# Patient Record
Sex: FEMALE | Race: WHITE
[De-identification: names, ages, dates, MRNs, and addresses within clinical notes are randomized per-mention and may not be internally consistent; named-entity substitution may affect disease eponyms.]

---

## 2017-01-27 ENCOUNTER — HOSPITAL ENCOUNTER (EMERGENCY)
Dept: HOSPITAL 25 - UCCORT | Age: 32
Discharge: HOME | End: 2017-01-27
Payer: SELF-PAY

## 2017-01-27 VITALS — SYSTOLIC BLOOD PRESSURE: 114 MMHG | DIASTOLIC BLOOD PRESSURE: 61 MMHG

## 2017-01-27 DIAGNOSIS — F17.210: ICD-10-CM

## 2017-01-27 DIAGNOSIS — B35.6: Primary | ICD-10-CM

## 2017-01-27 PROCEDURE — G0463 HOSPITAL OUTPT CLINIC VISIT: HCPCS

## 2017-01-27 PROCEDURE — 99212 OFFICE O/P EST SF 10 MIN: CPT

## 2017-01-27 NOTE — UC
Skin Complaint HPI





- HPI Summary


HPI Summary: 





pt reports sudden onset of pruritic circular dry skin mild erythematous area in 

left groin. P treprots that she has had exposure to ring worm.  





- History of Current Complaint


Time Seen by Provider: 01/27/17 19:52


Stated Complaint: RASH


Hx Obtained From: Patient


Hx Last Menstrual Period: 1/3/17


Pregnant?: No


Onset/Duration: Sudden Onset, Lasting Hours


Skin Exposure Onset/Duration: Hours Ago


Timing: Constant


Onset Severity: Mild


Current Severity: Mild


Location: Discrete - left groin


Character: Pruritus, Redness


Aggravating: Touch


Alleviating: Unknown


Associated Signs & Symptoms: Positive: Rash





- Allergy/Home Medications


Allergies/Adverse Reactions: 


 Allergies











Allergy/AdvReac Type Severity Reaction Status Date / Time


 


No Known Allergies Allergy   Verified 01/27/17 19:55














Review of Systems


Constitutional: Negative


Skin: Rash


Eyes: Negative


ENT: Negative


Respiratory: Negative


Cardiovascular: Negative


Gastrointestinal: Negative


Genitourinary: Negative


Motor: Negative


Neurovascular: Negative


Musculoskeletal: Negative


Neurological: Negative


Psychological: Negative


All Other Systems Reviewed And Are Negative: Yes





PMH/Surg Hx/FS Hx/Imm Hx


Previously Healthy: Yes





- Surgical History


Surgical History: Yes


Surgery Procedure, Year, and Place: fx left ankle/pin in it as a child





- Family History


Known Family History: Positive: Other - positive FM for Tinea





- Social History


Lives: With Family


Alcohol Use: None


Substance Use Type: None


Smoking Status (MU): Light Every Day Tobacco Smoker


Type: Cigarettes


Amount Used/How Often: 5 cigs daily


Household Exposure Type: Cigarettes





- Immunization History


Most Recent Influenza Vaccination: none





Physical Exam


Triage Information Reviewed: Yes


Appearance: Well-Appearing


Vital Signs: 


 Initial Vital Signs











Temp  98.6 F   01/27/17 19:51


 


Pulse  79   01/27/17 19:51


 


Resp  15   01/27/17 19:51


 


BP  114/61   01/27/17 19:51


 


Pulse Ox  100   01/27/17 19:51











Vital Signs Reviewed: Yes


Eye Exam: Normal


Dental Exam: Other


Dental: Positive: Gross Decay/Caries @, Other: - gingivitis


Respiratory Exam: Other


Respiratory: Positive: No respiratory distress


Musculoskeletal Exam: Normal


Neurological Exam: Normal


Psychological Exam: Normal


Skin Exam: Other - quarter size circular area left groin ,with dry flaky skin 

mild swelling





Course/Dx





- Differential Diagnoses - Skin Complaint


Differential Diagnoses: Tinea, Other - Syphilis chancre





- Diagnoses


Provider Diagnoses: tinea cruris





Discharge





- Discharge Plan


Condition: Stable


Disposition: HOME


Prescriptions: 


Terbinafine HCl (Topical) [Lamisil At] 1 % EX Q8HR #1 tube


Patient Education Materials:  Skin Yeast Infection (ED)


Referrals: 


Freya Feliciano MD [Primary Care Provider] -

## 2020-03-28 ENCOUNTER — HOSPITAL ENCOUNTER (EMERGENCY)
Dept: HOSPITAL 25 - UCCORT | Age: 35
Discharge: HOME | End: 2020-03-28
Payer: COMMERCIAL

## 2020-03-28 VITALS — SYSTOLIC BLOOD PRESSURE: 118 MMHG | DIASTOLIC BLOOD PRESSURE: 79 MMHG

## 2020-03-28 DIAGNOSIS — K04.7: Primary | ICD-10-CM

## 2020-03-28 DIAGNOSIS — K02.9: ICD-10-CM

## 2020-03-28 DIAGNOSIS — F17.210: ICD-10-CM

## 2020-03-28 PROCEDURE — G0463 HOSPITAL OUTPT CLINIC VISIT: HCPCS

## 2020-03-28 PROCEDURE — 99212 OFFICE O/P EST SF 10 MIN: CPT

## 2020-03-28 NOTE — XMS REPORT
Summary of Care

 Created on:2020



Patient:Mary Desir

Sex:Female

:1985

External Reference #:[id number]





Demographics







 Address  [address]



   [place] [address]

 

 Home Phone  [phone]

 

 Preferred Language  English

 

 Marital Status  Not  or 

 

 Mu-ism Affiliation  Unknown

 

 Race  White

 

 Ethnic Group  Not  or 









Author







 Organization  [facility]

 

 Address  [address] [place] [address]









Support







 Name  Relationship  Address  Phone

 

 Flora Vivar  Mother  Unavailable  [phone]









Care Team Providers







 Name  Role  Phone

 

 Byron Freitas LUANA  Primary Care Provider  [phone]









Reason for Visit







 Reason  Comments

 

 Donor Evaluation  







Encounter Details







 Date  Type  Department  Care Team  Description

 

 2020  Office Visit  [facility]  Bre Israel,  Kidney donor (
Primary



     [facility])



     [facility]



  [address]



  



     [address] [facility]



  [address] [place] [address] [address]



  [place]  [address]



  



     [id number] [phone]  



       (Fax)  







Allergies

No Known Allergiesdocumented as of this encounter (statuses as of 2020)



Medications







 Medication  Sig  Dispensed  Refills  Start Date  End Date  Status

 

 sertraline (ZOLOFT)  Take 50 mg    0  03/15/2018    Active



 50 MG tablet  by mouth          



   daily          

 

 famotidine (PEPCID)  Take 20 mg    0  2018    Active



 20 MG tablet  by mouth          



   daily          

 

 acetaminophen  Take 1,000    0      Active



 (TYLENOL) 500 MG  mg by mouth          



 tablet  every 6          



   (six) hours          



   as needed          



   for Pain          

 

 ibuprofen  Take 600 mg    0  2018  Discontinued



 (ADVIL,MOTRIN) 600 MG  by mouth          



 tablet  every 6          



   (six) hours          



   as needed          

 

 ondansetron  Take 1  12 tablet  0  2018  Discontinued



 (ZOFRAN-ODT) 4 MG  tablet by          



 disintegrating tablet  mouth every          



   8 (eight)          



   hours as          



   needed  for          



   Nausea          



documented as of this encounter (statuses as of 2020)



Active Problems

No known active problemsdocumented as of this encounter (statuses as of 2020)



Social History







 Tobacco Use  Types  Packs/Day  Years Used  Date

 

 Never Smoker        









 Alcohol Use  Drinks/Week  oz/Week  Comments

 

 No      









 Sex Assigned at Birth  Date Recorded

 

 Not on file  









 Job Start Date  Occupation  Industry

 

 Not on file  Not on file  Not on file









 Travel History  Travel Start  Travel End









 No recent travel history available.



documented as of this encounter



Last Filed Vital Signs







 Vital Sign  Reading  Time Taken  Comments

 

 Blood Pressure  119/73  2020 10:52 AM EST  

 

 Pulse  71  2020 10:52 AM EST  

 

 Temperature  36.4 

  2020 10:52 AM EST  



   C (97.5 

    



   F)    

 

 Respiratory Rate  -  -  

 

 Oxygen Saturation  99%  2020 10:52 AM EST  

 

 Inhaled Oxygen Concentration  -  -  

 

 Weight  75.8 kg (167 lb)  2020 10:52 AM EST  

 

 Height  176 cm (5' 9.29")  2020 10:52 AM EST  

 

 Body Mass Index  24.45  2020 10:52 AM EST  



documented in this encounter



Progress Notes

Zachary Ramirez - 2020 11:00 AM ESTFormatting of this note might be 
different from the original.

Renal Transplant Donor History and Physical



Chief Complaint: Mary Desir is presenting for evaluation for Donor 
Nephrectomy.



HISTORY OF PRESENT ILLNESS :



Mary Desir is a 34 y.o. female with no significant PMH who presents for 
evaluation regarding kidney donation to her friend, Raymundo Truong, an ESRD 
patient with this clinic. She states that she met Raymundo ~1 year ago through a 
friend and wants to be see if she would be a match. She has not told Raymundo that 
she is going through this workup yet.



She has no chronic medical conditions and reports no recent illnesses or 
hospitalizations. Her PSH is significant for a laprascopic cholecystectomy in 
2018 that did not require a hospitalization. She denied any anesthesia-related 
issues following the surgery. She had an ankle pin placed at age 7. She has 
three children (13, 6, and 2) all born vaginally with no post-partum 
complications.



She is not currently taking any medications, including birth control, and she 
states she is not taking daily multivitamins. She reports no known allergies.



Her family history was significant for high cholesterol in her maternal 
grandfather. Her mother has no significant medical history, and she does not 
know anything about her father's medical history.



Of note in her social history is a 2 pack-year smoking history. She states that 
she has attempted toquit in the past, and states that she could quit prior to 
the surgery. She denies alcohol use, IV drug use, and use of prescription drugs 
not prescribed to her. She denies any recent travel.



Of note in her ROS was occasional diarrhea following meals since her 
cholecystectomy. The rest of the ROS was as documented below.



REVIEW OF SYSTEMS:

Constitutional: Negative.  Negative for fever, chills, diaphoresis, activity 
change, appetite change, fatigue and unexpected weight change.

HENT: Negative for facial swelling, neck pain and neck stiffness.

Eyes: Negative for discharge, itching and visual disturbance.

Respiratory: Negative for cough, choking, chest tightness, shortness of breath 
and wheezing.

Cardiovascular: Negative for chest pain, palpitations and leg swelling.

Gastrointestinal: Notable for occasional diarrhea following meals since her 
cholecystectomy. Negative for nausea, vomiting, abdominal pain, constipation, 
blood in stool, abdominal distention, anal bleeding and rectal pain.

Genitourinary: Negative for dysuria, flank pain and difficulty urinating.

Musculoskeletal: Negative for joint swelling and arthralgias.

Skin: Negative.  Negative for color change, pallor, rash and wound.



Allergies:

Patient has no known allergies.



Medications :

No medications at this time.



Social history

Social History



Socioeconomic History

 Marital status: Single

  Spouse name: Not on file

 Number of children: Not on file

 Years of education: Not on file

 Highest education level: Not on file

Occupational History

 Not on file

Social Needs

 Financial resource strain: Not on file

 Food insecurity:

  Worry: Not on file

  Inability: Not on file

 Transportation needs:

  Medical: Not on file

  Non-medical: Not on file

Tobacco Use

 Smoking status: Never Smoker

Substance and Sexual Activity

 Alcohol use: No

 Drug use: No

 Sexual activity: Not on file

Lifestyle

 Physical activity:

  Days per week: Not on file

  Minutes per session: Not on file

 Stress: Not on file

Relationships

 Social connections:

  Talks on phone: Not on file

  Gets together: Not on file

  Attends Congregation service: Not on file

  Active member of club or organization: Not on file

  Attends meetings of clubs or organizations: Not on file

  Relationship status: Not on file

 Intimate partner violence:

  Fear of current or ex partner: Not on file

  Emotionally abused: Not on file

  Physically abused: Not on file

  Forced sexual activity: Not on file

Other Topics Concern

 Not on file

Social History Narrative

 Not on file





PHYSICAL EXAM:



Blood pressure 119/73, pulse 71, temperature 36.4 C, temperature source Oral
, height 1.76 m, weight 75.8 kg (167 lb), SpO2 99 %, currently breastfeeding.

Wt Readings from Last 3 Encounters:

20 75.8 kg (167 lb)

18 70.3 kg (155 lb)

Body mass index is 24.45 kg/m.



Constitutional: Sitting up in chair, NAD, appears nontoxic.

Head: Normocephalic and atraumatic.

Neck: Normal range of motion.

Cardiovascular: S1S2 heart sounds with normal rate and rhythm. No murmurs, rubs
, or gallops.

Pulmonary/Chest: Normal respiratory effort. Lungs clear and equal bilaterally. 
No respiratory distress.

Abdominal: Soft, flat, and non-tender.

Musculoskeletal: Normal range of motion, no edema and no tenderness.

Neurological: alert and oriented to person, place, and time. Gait normal.

Skin: Skin is warm and dry. No rash noted.

Psychiatric: normal mood and affect.  behavior is normal. Judgment and thought 
content normal.



PRIOR LABORATORY DATA AVAILABLE :



I reviewed the following laboratory data from the old and existing records



Lab Results

Component Value Date

  2018

 K 3.3 (L) 2018

  2018

 BICARBONATE 31 2018

 BUN 10 2018

 CREATININE 0.84 2018

 ALBUMIN 3.8 2018

 GLUCOSE 83 2018

 CALCIUM 9.1 2018

 WBC 7.2 2020

 HGB 14.6 2020

 MCV 94.9 2020

  2020



PT,pTT,INR

A1c



ASSESSMENT AND PLAN:



Mary Desir is a 34 y.o. female in Transplant Clinic today for preoperative 
assessment for donor nephrectomy. Based on surgical assessment, the patient is 
an acceptable candidate for donation with the understanding that she must quit 
smoking at least 6 weeks before surgery. She was counseled regarding the need 
to stop smoking in order to be a donor, as well as for her general health and 
well being.The patient will be evaluated by a Comprehensive Donor Selection 
Committee and their eligibility will be determined based on this assessment. 
Based on the initial discussion with her, she may not fullyunderstand what the 
donation process entails and she would benefit from further patient education 
inthis process as well as additional evaluation from the Selection Committee.



There are no benefits for kidney donors, with the exception of emotional 
satisfaction. Risks includebleeding, infection, injury to a major blood vessel, 
cardiac or neurologic event, and even death. Wediscussed that there are three 
ways to perform donor nephrectomy: open, laparoscopic, and Robotic assisted. We 
discussed that we prefer the robotic approach at our center due to surgeon 
comfort. We discussed a normal robotic donor nephrectomy in terms of length of 
procedure, post-operative expectations, and typical length of stay. The patient 
was consented and wishes to proceed.



The patient was seen and examined with Dr. Israel.



Zachary Ramirez

MS3

I saw and examined this patient and I reviewed lab and medications  with 
student.I agree with the student, assessment and plan.

Based on history and physical exam she can be good candidate for kidney 
donation for living donor kidney transplantation, provided that she quit 
smoking. She made statement that she will quit smoking in coming days.

I discussed risk and possible complications  associated with donor nephrectomy 
including bleeding, damage to nearby organs, infections, lymphorrhea, 
conversion to open procedure, neurological and cardiac events even death. She 
agreed to proceed further investigations.



Bre Israel MD, PhD

Assistant Professor of Surgery

Department of Surgery

[facility].Electronically signed by Bre Israel MD at 
2020  3:26 PM ESTdocumented in this encounter



Plan of Treatment







 Health Maintenance  Due Date  Last Done  Comments

 

 MMR Vaccines (1 of 1 - Standard  1988    



 series)      

 

 Varicella Vaccines (1 of 2 -  1988    



 2-dose childhood series)      

 

 DTaP,Tdap,and Td Vaccines (1 -  1994    



 Tdap)      

 

 HIV Screening  2000    

 

 Cervical Cancer Screening 5 years  2008    

 

 Influenza Vaccine  10/01/2019    

 

 Pneumococcal Vaccine: 65+ Years (1  2052    



 of 2 - PCV13)      

 

 HIB Vaccines  Aged Out    No longer eligible based on



       patient's age to complete this



       topic

 

 Hepatitis A Vaccines  Aged Out    No longer eligible based on



       patient's age to complete this



       topic

 

 Hepatitis B Vaccines  Aged Out    No longer eligible based on



       patient's age to complete this



       topic

 

 IPV Vaccines  Aged Out    No longer eligible based on



       patient's age to complete this



       topic

 

 Pneumococcal Vaccine: Pediatrics  Aged Out    No longer eligible based on



 (0 to 5 Years) and At-Risk      patient's age to complete this



 Patients (6 to 64 Years)      topic



documented as of this encounter



Results

Not on filedocumented in this encounter



Visit Diagnoses







 Diagnosis

 

 Kidney donor - Primary



documented in this encounter

## 2020-03-28 NOTE — XMS REPORT
Summary of Care

 Created on:2020



Patient:Mary Desir

Sex:Female

:1985

External Reference #:[id number]





Demographics







 Address  [address]



   [place] [address]

 

 Home Phone  [phone]

 

 Preferred Language  English

 

 Marital Status  Not  or 

 

 Mu-ism Affiliation  Unknown

 

 Race  White

 

 Ethnic Group  Not  or 









Author







 Organization  [facility]

 

 Address  [address] [place] [address]









Support







 Name  Relationship  Address  Phone

 

 Flora Vivar  Mother  Unavailable  [phone]









Care Team Providers







 Name  Role  Phone

 

 Byron Freitas P  Primary Care Provider  [phone]









Reason for Referral

Diagnostic Lab (Routine)





 Status  Reason  Specialty  Diagnoses /  Referred By Contact  Referred To 
Contact



       Procedures    

 

 Open      Diagnoses



Kidney donor  Transplant  



       



Procedures



24hr Urine Creatinine  Provider-Based [address]  



         [address]



  



         Phone: [phone]



  



         Fax: [phone]  





Diagnostic Lab (Routine)





 Status  Reason  Specialty  Diagnoses /  Referred By Contact  Referred To 
Contact



       Procedures    

 

 Open      Diagnoses



Kidney donor  Transplant  



       



Procedures



Protein, urine, 24 hour  Provider-Based [address]  



         [address]



  



         Phone: [phone]



  



         Fax: [phone]  





Diagnostic Lab (Routine)





 Status  Reason  Specialty  Diagnoses /  Referred By Contact  Referred To 
Contact



       Procedures    

 

 Open      Diagnoses



Kidney donor  Transplant  



       



Procedures



Creatinine clearance  Provider-Based [address]  



         [address]



  



         Phone: [phone]



  



         Fax: [phone]  









Reason for Visit







 Reason  Comments

 

 Donor Evaluation  







Encounter Details







 Date  Type  Department  Care Team  Description

 

 2020  Office Visit  [facility] Transplant  Shaun Starks  Kidney donor (
Primary



     [facility]  MD Klaudia VALDEZ)



     [place] [address]



  



     [address]



  [address]nd Floor



  



     [address] [place]  [address]



  



     [address] [phone]  



       (Fax)  







Allergies

No Known Allergiesdocumented as of this encounter (statuses as of 2020)



Medications







 Medication  Sig  Dispensed  Refills  Start Date  End Date  Status

 

 sertraline (ZOLOFT)  Take 50 mg    0  03/15/2018    Active



 50 MG tablet  by mouth          



   daily          

 

 famotidine (PEPCID)  Take 20 mg    0  2018    Active



 20 MG tablet  by mouth          



   daily          

 

 acetaminophen  Take 1,000    0      Active



 (TYLENOL) 500 MG  mg by mouth          



 tablet  every 6          



   (six) hours          



   as needed          



   for Pain          

 

 ibuprofen  Take 600 mg    0  2018  Discontinued



 (ADVIL,MOTRIN) 600 MG  by mouth          



 tablet  every 6          



   (six) hours          



   as needed          

 

 ondansetron  Take 1  12 tablet  0  2018  Discontinued



 (ZOFRAN-ODT) 4 MG  tablet by          



 disintegrating tablet  mouth every          



   8 (eight)          



   hours as          



   needed  for          



   Nausea          



documented as of this encounter (statuses as of 2020)



Active Problems

No known active problemsdocumented as of this encounter (statuses as of 2020)



Social History







 Tobacco Use  Types  Packs/Day  Years Used  Date

 

 Never Smoker        









 Alcohol Use  Drinks/Week  oz/Week  Comments

 

 No      









 Sex Assigned at Birth  Date Recorded

 

 Not on file  









 Job Start Date  Occupation  Industry

 

 Not on file  Not on file  Not on file









 Travel History  Travel Start  Travel End









 No recent travel history available.



documented as of this encounter



Last Filed Vital Signs

Not on filedocumented in this encounter



Progress Notes

Shaun Starks MD - 2020 10:00 AM ESTFormatting of this note might be 
different from the original.

RENAL TRANSPLANT DONOR EVALUATION



Mary Desir is here in the transplant clinic for evaluation as a potential 
kidney transplant donorfor a friend.



Mrs. Desir is a 34 y.o.  woman who considers herself to be healthy 
without significant medical history.  Her past history is notable for having 
three pregnancies all of which were uncomplicated by diabetes, hypertension, 
edema or toxemia. All three pregnancies resulted in 's.  Six years ago 
after her second child Mrs. Desir suffered from post-partum depression.  
Despite multiple medications, she required a 6 day hospitalization for a 
suicide attempt.  With counseling, over time she completely recovered and has 
not had any recurrences.  After her third pregnancy she did not suffer from post
-partum depression.  Mrs. Desir now remains in counseling and does not take any 
medications for depression.



She comes to clinic with some concerns. The donor relationship with the 
recipient consists of seeingeach other weekly. His motivation for donation is 
fair. She is uncertain of future events and the risk of adverse effects. She 
comes today just to see if she is a potential donor.



The patient comes to clinic feeling well. She denies having any renal disorders
, hypertension, diabetes, hematuria, urinary infections, kidney stones. There 
is also no history of heart disease, chest pain or SOB, cancer, hepatitis, 
tuberculosis or other infectious diseases, jaundice or blood clots.



Allergies: No Known Allergies



MEDS:

Current Outpatient Medications:

  acetaminophen (TYLENOL) 500 MG tablet, Take 1,000 mg by mouth every 6 (
six) hours as neededfor Pain, Disp: , Rfl:

  famotidine (PEPCID) 20 MG tablet, Take 20 mg by mouth daily , Disp: , 
Rfl:

  sertraline (ZOLOFT) 50 MG tablet, Take 50 mg by mouth daily , Disp: , 
Rfl:



PMH: As above



PSH:



1. Cholecystectomy

2. Right ankle fracture with pin



FH:SH: Significant for hyperlipidemia but no kidney diseases, diabetes or 
hypertension.  Mrs. Desir smokes 5 cigarettes daily, no alcohol or drug use. No 
travel outside of [place].



Mrs. Desir is a single mother. Her supports systems at home consist of her 
mother who works seasonally and a friend. She is currently unemployed and only 
works part-time in a diner.



Review of Systems

Constitutional: Negative.  Negative for fever, chills, diaphoresis, activity 
change, appetite change, fatigue and unexpected weight change.

HENT: Negative for facial swelling, neck pain and neck stiffness.

Eyes: Negative for discharge, itching and visual disturbance.

Respiratory: Negative for cough, choking, chest tightness, shortness of breath 
and wheezing.

Cardiovascular: Negative.  Negative for chest pain, palpitations and leg 
swelling.

Gastrointestinal: Negative.  Negative for nausea, vomiting, abdominal pain, 
diarrhea, constipation, blood in stool, abdominal distention, anal bleeding and 
rectal pain.

Genitourinary: Negative for dysuria, flank pain and difficulty urinating.

Musculoskeletal: Negative for joint swelling and arthralgias.

Skin: Negative.  Negative for color change, pallor, rash and wound.

Neurological: Negative for dizziness and syncope.

Hematological: Negative.  Negative for adenopathy. Does not bruise/bleed easily.

Psychiatric/Behavioral: Negative for behavioral problems, confusion and 
agitation.

All other systems reviewed and are negative.



Physical Exam

BMI 24.45 /73

Constitutional: The patient is oriented to person, place, and time. appears well
-nourished. No distress.

Head: Normocephalic and atraumatic. Eyes: Pupils are equal, round, and reactive 
to light. Neck: Normal range of motion. Neck supple. No JVD present. No 
thyromegaly present.

Cardiovascular: Normal rate, regular rhythm, normal heart sounds and intact 
distal pulses. Exam reveals no gallop and no friction rub. No murmur heard.

Pulmonary/Chest: Effort normal and breath sounds normal. No respiratory 
distress. No wheezes. No rales.

Abdominal: Soft. Bowel sounds are normal, exhibits no distension and no 
abdominal bruit. There is notenderness. There is no rebound and no guarding.

Musculoskeletal: Normal range of motion, no edema and no tenderness, no 
cervical adenopathy.

Neurological: alert and oriented to person, place, and time. Gait normal.

Skin: Skin is warm and dry. No rash noted.

Psychiatric: normal mood and affect. Behavior is normal. Judgment and thought 
content normal.



LABS:



Lab Results

Component Value Date

  2020

 K 4.0 2020

  2020

 BICARBONATE 27 2020

 BUN 14 2020

 CREATININE 0.79 2020

 ALBUMIN 3.8 2018

 GLUCOSE 87 2020

 HGBA1C 4.8 2020

 CALCIUM 9.4 2020

 WBC 7.2 2020

 HGB 14.6 2020

 MCV 94.9 2020

  2020



Creatinine 0.79  eGFR &gt;90 ml/min

U/A 3+ hemoglobin 1 RBC

Urinary protein/creatinine 0.09

Urinary albumin/creatinine &lt;12



Assessment and Plans:



Mary Desir is a healthy 34 y.o. woman who is planning to donate her kidney. 
At this time, Mrs. Desir is potentially an acceptable candidate for donation.



Mrs. Desir has reservations about donation concerning her long-term health. Her 
personal and financial support systems at home for her family may not be 
sufficient should she have post-surgical or medical complications after 
donation. Given her history of suicidal attempt and hospitalization, she would 
require a thorough psychological evaluation and a letter from her counselor. 
She came for donor evaluation to receive more information about the procedure 
and decided that she needs more time to consider proceeding with the evaluation.



The donor and I spent the majority of our visit in education and counseling 
regard donation. We reviewed the donor evaluation process, the fact that 
communication between the donor and the Transplant Center is entirely 
confidential, and that the evaluation process includes potential discovery of 
unintended or unexpected information that may have a long-term impact on 
insurance and emotional status and other unintended consequences. The patient 
understood this and consented to this evaluation process.  We reviewed the 
surgical procedure, postoperative treatment, and potential need for prolonged 
recovery even in the best of circumstances.  We discussed alternative 
treatments for the transplant recipient, namely dialysis, alternative donors, 
or listing on the  donor list.  We discussed potential medical or 
psychosocial risks including the risk for death, infection, long-term issues 
related to abandonment and potential complications associated with development 
of hypertension, diabetes, etc... later in life and the fact that donation will 
not reduce these risks in the long term.  We reviewed National and Center-
specific outcomes data for recipient and living donors. We discussed the 
possibility of disease recurrence leading to graft failure in the recipient and 
the emotional consequencesthat this may have. We discussed the possibility that 
future health problems related to the donationmight not be covered by existing 
or future insurance and that the ability to obtain health, disability or life 
insurance in the future might be affected. The patient was also instructed that 
one has the right to opt out of the donation process at any time during the 
process. The patient understands that payment for an organ is strictly 
prohibited. Ms. Desir was informed the Transplant Center is required to provide/
obtain long-term follow-up at 6, 12, and 24 months post-donation. This 
requirement andits importance were emphasized to the potential donor. All 
questions were answered during today's visit.



The patient was counseled regarding the importance routine health maintenance 
and follow up with their primary physician.  To the extent possible, the 
patient should attempt to prevent the development of obesity, diabetes, and 
hypertension, all of which could impact longterm health, certainly with a 
single functioning kidney.  In addition, the importance of limiting the use of 
NSAID's after donation was stressed.



Again I appreciate Mrs. Desir' willingness to donate her kidney.



Shaun Starks MD

Transplant Nephrology

[place] [id number]

[phone] signed by Shaun Starks MD at 2020  8:25 
AM ESTdocumented in this encounter



Plan of Treatment







 Name  Type  Priority  Associated Diagnoses  Order Schedule

 

 Creatinine clearance  Lab  Routine  Kidney donor  Expected: 2020,



         Expires: 2020

 

 Microalbumin, Urine  Lab  Routine  Kidney donor  Expected: 2020,



         Expires: 2020

 

 Protein, urine, 24 hour  Lab  Routine  Kidney donor  Expected: 2020,



         Expires: 2020

 

 24hr Urine Creatinine  Lab  Routine  Kidney donor  Expected: 2020,



         Expires: 2020

 

 Urinalysis with microscopic  Lab  Routine  Kidney donor  Expected: 2020,



         Expires: 2020

 

 Creatinine with GFR  Lab  Routine  Kidney donor  Expected: 2020,



         Expires: 2020









 Health Maintenance  Due Date  Last Done  Comments

 

 MMR Vaccines (1 of 1 - Standard  1988    



 series)      

 

 Varicella Vaccines (1 of 2 -  1988    



 2-dose childhood series)      

 

 DTaP,Tdap,and Td Vaccines (1 -  1994    



 Tdap)      

 

 HIV Screening  2000    

 

 Cervical Cancer Screening 5 years  2008    

 

 Influenza Vaccine  10/01/2019    

 

 Pneumococcal Vaccine: 65+ Years (1  2052    



 of 2 - PCV13)      

 

 HIB Vaccines  Aged Out    No longer eligible based on



       patient's age to complete this



       topic

 

 Hepatitis A Vaccines  Aged Out    No longer eligible based on



       patient's age to complete this



       topic

 

 Hepatitis B Vaccines  Aged Out    No longer eligible based on



       patient's age to complete this



       topic

 

 IPV Vaccines  Aged Out    No longer eligible based on



       patient's age to complete this



       topic

 

 Pneumococcal Vaccine: Pediatrics  Aged Out    No longer eligible based on



 (0 to 5 Years) and At-Risk      patient's age to complete this



 Patients (6 to 64 Years)      topic



documented as of this encounter



Procedures







 Procedure Name  Priority  Date/Time  Associated  Comments



       Diagnosis  

 

 ABO/RH  Routine  2020 10:25  Kidney donor  Results for this



     AM EST    procedure are in



         the results



         section.

 

 HLA MOLECULAR MABDR  Routine  2020 10:15  Kidney donor  Results for this



     AM EST    procedure are in



         the results



         section.

 

 URINE RANDOM TP/CRE  Routine  2020 10:15  Kidney donor  Results for this



 RATIO    AM EST    procedure are in



         the results



         section.

 

 MICROALBUMIN,URINE  Routine  2020 10:15  Kidney donor  Results for this



     AM EST    procedure are in



         the results



         section.

 

 CYSTATIN C WITH GFR  Routine  2020 10:15  Kidney donor  Results for this



     AM EST    procedure are in



         the results



         section.

 

 URINALYSIS WITH  Routine  2020 10:15  Kidney donor  Results for this



 MICROSCOPIC    AM EST    procedure are in



         the results



         section.

 

 CBC AND DIFFERENTIAL  Routine  2020 10:15  Kidney donor  Results for this



     AM EST    procedure are in



         the results



         section.

 

 HEMOGLOBIN A1C  Routine  2020 10:15  Kidney donor  Results for this



     AM EST    procedure are in



         the results



         section.

 

 BASIC METABOLIC PANEL  STAT  2020 10:15  Kidney donor  Results for this



     AM EST    procedure are in



         the results



         section.



documented in this encounter



Results

ABO/RH (2020 10:25 AM EST)





 Component  Value  Ref Range  Performed At  Pathologist Signature

 

 ABO/RH(D)  O POS    [facility]  



       Clin Pathology  

 

 Site  Performed at [facility],    Clin Pathology  



   [place], [place]      









 Specimen

 

 EDTA Whole Blood









 Performing Organization  Address  City/State/Zipcode  Phone Number

 

 [facility] CLINICAL PATHOLOGY  [address] [place] [address]  [phone]
[phone]

 

 [facility]  [address] [place] [address]  



 Pathology      



Basic Metabolic Panel (2020 10:15 AM EST)





 Component  Value  Ref Range  Performed At  Pathologist Signature

 

 Bicarbonate  27  22 - 29 mmol/L  [facility]ology  

 

 Chloride  103  98 - 107 mmol/L  [facility]  

 

 Creatinine  0.79  0.50 - 0.90  [facility]  



     mg/dL  [facility]  

 

 Glucose  87  70 - 140 mg/dL  [facility]  

 

 Potassium  4.0  3.4 - 5.1 mmol/L  [facility]  

 

 Sodium  141  136 - 145 mmol/L  [facility]  

 

 Blood Urea Nitrogen  14  6 - 20 mg/dL  [facility]  

 

 Anion Gap  11  8 - 15 mmol/L  [facility]  

 

 Osmolality, Nic  292  275 - 300  [facility]  



     mosm/kg  [facility]  

 

 BUN/Cre Ratio  18    [facility]  

 

 Calcium  9.4  8.6 - 10.0 mg/dL  [facility]  



       [facility]  

 

 GFR Non   >90  >60  [facility]  



 American 2009 CDK-EPI    mL/min/1.73m2  [facility]  

 

 GFR African American  >90  >60  [facility]  



 2009 CKD-EPI    mL/min/1.73m2  [facility]  









 Specimen

 

 Plasma









 Performing Organization  Address  City/State/Zipcode  Phone Number

 

 [facility] CLINICAL PATHOLOGY  [address] [place] [address] [phone]
[phone]

 

 [facility]  [address] [place] [address]  



 Pathology      



CBC and Differential (2020 10:15 AM EST)





 Component  Value  Ref Range  Performed At  Pathologist



         Signature

 

 White Blood Cell  7.2  4 - 10  [facility]  



     10*3/uL  [facility]  



       Pathology  

 

 Red Blood Cell  4.54  4.1 - 5.3  [facility]  



     10*6/uL  [facility]  



       Pathology  

 

 Hemoglobin  14.6  11.5 - 15.5  [facility]  



     g/dL  [facility]  



       Pathology  

 

 Hematocrit  43.0  36 - 45 %  [facility]  



       [facility]  



       Pathology  

 

 Mean Cell Volume  94.9  80 - 96 fL  [facility]  



       [facility]  



       Pathology  

 

 Mean Cell Hemoglobin  32.2  27 - 33 pg  [facility]  



       [facility]  



       Pathology  

 

 Mean Cell Hgb Conc  34.0  32.0 - 36.0  [facility]  



     g/dL  [facility]  



       Pathology  

 

 Red Cell Dist Width  13.4  11.5 - 14.5 %  [facility]  



       Pathology  

 

 Platelet Count  226  150 - 400  [facility]  



     10*3/uL  [facility]  



       Pathology  

 

 Differential Type  Automated Diff    [facility]  



       [facility]  



       Pathology  

 

 Neutrophil  57  %  [facility]  



       [facility]  



       Pathology  

 

 Lymphocyte  32  %  [facility]  



       [facility]  



       Pathology  

 

 Monocyte  6  %  [facility]  



       [facility]  



       Pathology  

 

 Eosinophil  4  %  [facility]  



       [facility]  



       Pathology  

 

 Basophil  1  %  [facility]  



       [facility]  



       Pathology  

 

 Abs Neutrophil  4.10  1.8 - 7.0  [facility]  



     10*3/uL  [facility]  



       Pathology  

 

 Abs Lymphocyte  2.33  1.2 - 4.0  [facility]  



     10*3/uL  [facility]  



       Pathology  

 

 Abs Monocyte  0.44  0 - 0.8  [facility]  



     10*3/uL  [facility]  



       Pathology  

 

 Abs Eosinophil  0.26  0 - 0.5  [facility]  



     10*3/uL  [facility]  



       Pathology  

 

 Abs Basophil  0.08  0 - 0.2  [facility]  



     10*3/uL  [facility]  



       Pathology  

 

 Nucleated Red Blood  0  0 - 0  [facility]  



 Cells    /100{WBCs}  [facility]  



       Pathology  









 Specimen

 

 EDTA Whole Blood









 Performing Organization  Address  City/[facility]  Phone Number

 

 [facility] CLINICAL PATHOLOGY  [address] [place] [address]  [phone]
[phone]

 

 [facility]  [address] [place] [address]  



 Pathology      



Hemoglobin A1c (2020 10:15 AM EST)





 Component  Value  Ref Range  Performed At  Pathologist



         Signature

 

 Hemoglobin A1C  4.8  4.0 - 6.0 %  [facility]  



   Comment:    [facility]  



   (NOTE)    Pathology  



   <5.7% 

 

 Average risk of diabetes(ADA)      



   5.7-6.4% 

Increased risk of diabetes(ADA)      



   >/= 6.5% 

Diagnostic for diabetes(ADA)      



         



         

 

 Estimated Avg  90  <126 mg/dL  [facility]  



 Glucose      [facility]  



       Pathology  









 Specimen

 

 Whole Blood









 Performing Organization  Address  City[place]  Phone Number

 

 [facility]  [address] [place] [address]  [phone]
[phone]

 

 [facility]  [address] [place] [address]  



 Pathology      



Urinalysis with microscopic (2020 10:15 AM EST)





 Component  Value  Ref Range  Performed At  Pathologist Signature

 

 Color  Yellow    [facility]  



       [facility]  

 

 Clarity  Clear    [facility]  

 

 Specific Gravity  1.014  1.003 - 1.030  [facility]  

 

 PH Urine  6.0  5.0 - 8.0  [facility]  

 

 Total Protein UA  Negative  Negative mg/dL  [facility]  

 

 Glucose UA  Negative  Negative mg/dL  [facility]  

 

 Ketone Urine  Negative  Negative mg/dL  [facility]  

 

 Bilirubin  Negative  Negative  [facility]  

 

 Hemoglobin, Urine  3+ (A)  Negative  [facility]  

 

 Leukocyte Esterase  Negative  Negative Arturo/uL  [facility]  

 

 Nitrite  Negative  Negative  [facility]  

 

 WBC  0  0 - 5 /HPF  [facility]  

 

 RBC  1  0 - 3 /HPF  [facility]  

 

 Squam Epithel, UA  2 (A)  None /HPF  [facility]  









 Specimen

 

 Urine









 Performing Organization  Address  City/[facility]ode  Phone Number

 

 [facility] CLINICAL PATHOLOGY  [address] [place] [address]  [phone]
[phone]

 

 [facility]  [address] [place] [address]  



 Pathology      



Microalbumin, Urine (2020 10:15 AM EST)





 Component  Value  Ref Range  Performed At  Pathologist



         Signature

 

 Microalbumin,  <12.0  mg/L  [facility]      [facility]  



       Pathology  

 

 Creatinine, Urine  90.8  mg/dl  [facility]  



       Pathology  

 

 Microalbumin/crea  Microalbumin less  <20.0 ug/mg  [facility]  



 tinine  than linear limit,  creat  [facility]  



   therefore no    Pathology  



   MALB/CRE ratio      



   available      









 Specimen

 

 Urine









 Performing Organization  Address  [place]  Phone Number

 

 [facility] CLINICAL PATHOLOGY  [address] [place] [address]  [phone]
[phone]

 

 [facility]  [address] [place] [address]  



 Pathology      



Cystatin C with GFR (2020 10:15 AM EST)





 Component  Value  Ref Range  Performed At  Pathologist Signature

 

 Cystatin C  0.95  0.50 - 1.25 mg/L  [facility]  



       Clin Pathology  

 

 eGFR 2012 CKD-EPI  86  >60 mL/min/1.73m2  [facility] Pathology  









 Specimen

 

 Plasma









 Performing Organization  Address  City/State[place]  Phone Number

 

 [facility] CLINICAL PATHOLOGY  [address] [place] [address]  [phone]
[phone]

 

 [facility]  [address] [place] [address]  



 Pathology      



Urine Random Total Protein Creatinine Ratio (2020 10:15 AM EST)





 Component  Value  Ref Range  Performed At  Pathologist Signature

 

 Total Protein, Urine  8  mg/dl  [facility]  



       Clin Pathology  

 

 Urinary Creat  90.8  mg/dL  [facility] Pathology  

 

 Urinary Prot/Creat  0.09  mg/mg{creat}  [facility]  



 Ratio      Clin Pathology  









 Specimen

 

 Urine









 Performing Organization  Address  [place] [place]  Phone [place]

 

 [facility] CLINICAL PATHOLOGY  [address] [place] [address]  [phone]
[phone]

 

 [facility]  [address] [place] [address]  



 Pathology      



HLA molecular mabdr (2020 10:15 AM EST)





 Component  Value  Ref Range  Performed At  Pathologist



         Signature

 

 HLA MOLECULAR MABDR  See Special    [facility]  



   Report    CLINICAL PATHOLOGY  









 Specimen

 

 EDTA Whole Blood









 Performing Organization  Address  [facility]  Phone Number

 

 [facility] CLINICAL PATHOLOGY  [address] [place] [address]  [phone]
[phone]



documented in this encounter



Visit Diagnoses







 Diagnosis

 

 Kidney donor - Primary



documented in this encounter

## 2020-03-28 NOTE — XMS REPORT
Continuity of Care Document (CCD)

 Created on:2020



Patient:Mary Desir

Sex:Female

:1985

External Reference #:[id number]





Demographics







 Address  [address]



   [place] [address]

 

 Home Phone  [phone]

 

 Mobile Phone  [phone]

 

 Work Phone  [phone]

 

 Preferred Language  en

 

 Marital Status  Not  or 

 

 Islam Affiliation  Unknown

 

 Race  White

 

 Ethnic Group  Not  or 









Author







 Name  Fei Menendez M.D. (transmitted by agent of provider Carlee Clay)

 

 Address  [address] [place] [id number]









Care Team Providers







 Name  Role  Phone

 

 Byron Freitas NP - Nurse  Care Team Information   [phone]
[phone]



 Practitioner    









Problems







 Active Problems  Provider  Date

 

 Depressive disorder  Jamila Holland M.D.  Onset: 2014

 

 Calculus of gallbladder with acute and  Byron Freitas FNP  Onset: 



 chronic cholecystitis    







Social History







 Type  Date  Description  Comments

 

 Birth Sex    Unknown  

 

 ETOH Use    Denies alcohol use  

 

 Tobacco Use  Start: Unknown End: Unknown  Patient is a former smoker  

 

 Recreational Drug Use    Denies Drug Use  







Allergies, Adverse Reactions, Alerts







 Description

 

 No Known Drug Allergies







Medications







 Active Medications  SIG  Qnty  Indications  Ordering Provider  Date

 

 Zoloft  1 by mouth every      Unknown  



    50mg Tablets  day        



           







Immunizations







 CPT Code  Status  Date  Vaccine  Lot #

 

 [id number]  Given  10/14/2014  MMR Vaccine, Live, For Subcutaneous Use  

 

 22334  Given  2014  flu vaccination  

 

 [id number]  Given  2013  Tdap injection  







Vital Signs







 Date  Vital  Result  Comment

 

 2018  3:21pm  BP Systolic  118 mmHg  









 BP Diastolic  72 mmHg  

 

 Height  66 inches  5'6"

 

 Weight  159.00 lb  

 

 BMI (Body Mass Index)  25.7 kg/m2  

 

 BSA (Body Surface Area)  1.81 m2  

 

 Ideal body weight in kilograms  59 kg  









 2016 10:35am  BP Systolic Sitting Left Arm  112 mmHg  









 BP Diastolic Sitting Left Arm  64 mmHg  

 

 Height  66 inches  5'6"

 

 Weight  148.00 lb  

 

 BMI (Body Mass Index)  23.9 kg/m2  

 

 BSA (Body Surface Area)  1.76 m2  

 

 Last Menstrual Period  [id number]  







Results







 Description

 

 No Information Available







Procedures







 Description

 

 No Information Available







Medical Devices







 Description

 

 No Information Available







Encounters







 Description

 

 No Information Available







Assessments







 Description

 

 No Information Available







Plan of Treatment

No Information Available



Functional Status







 Description

 

 No Information Available







Mental Status







 Description

 

 No Information Available







Referrals







 Description

 

 No Information Available

## 2020-03-28 NOTE — UC
Dental HPI





- HPI Summary


HPI Summary: 





33 yo female presents with dental pain. She tells me that she has bad teeth and 

over the last 2-3 days has noticed increasing right lower dental pain and 

swelling. She has had dental infections in the past and this feels similar. She 

has a dentist, but has not had good experiences with going there. She has been 

taking tylenol and ibuprofen with mild relief. Denies fever, chills, sinus 

symptoms, sore throat. 





- History of Current Complaint


Stated Complaint: DENTAL PAIN


Time Seen by Provider: 03/28/20 13:39


Hx Obtained From: Patient


Hx Last Menstrual Period: 1/3/17


Onset/Duration: Gradual Onset


Severity: Moderate


Pain Intensity: 7


Pain Scale Used: 0-10 Numeric





- Allergies/Home Medications


Allergies/Adverse Reactions: 


 Allergies











Allergy/AdvReac Type Severity Reaction Status Date / Time


 


No Known Allergies Allergy   Verified 03/28/20 13:47











Home Medications: 


 Home Medications





Amoxicillin PO (*) [Amoxicillin 875 MG (*)] 875 mg PO BID #14 tab 03/28/20 [Rx]


Chlorhexidine MW 0.12% 473ML* [Peridex Mouth Wash 0.12%] 15 ml MT BID #1 btl 03/ 28/20 [Rx]











PMH/Surg Hx/FS Hx/Imm Hx





- Additional Past Medical History


Additional PMH: 





None





- Surgical History


Surgical History: Yes


Surgery Procedure, Year, and Place: fx left ankle/pin in it as a child





- Family History


Known Family History: Positive: Other - positive FM for Tinea





- Social History


Lives: With Family


Alcohol Use: None


Substance Use Type: None


Smoking Status (MU): Light Every Day Tobacco Smoker


Type: Cigarettes


Amount Used/How Often: 5 cigs daily


Household Exposure Type: Cigarettes





- Immunization History


Most Recent Influenza Vaccination: none





Review of Systems


All Other Systems Reviewed And Are Negative: No


Constitutional: Positive: Negative


Skin: Positive: Negative


Eyes: Positive: Negative


ENT: Positive: Dental Pain


Respiratory: Positive: Negative


Cardiovascular: Positive: Negative


Neurological/Mental Status: Positive: Negative


Psychological: Positive: Negative





Physical Exam





- Summary


Physical Exam Summary: 





GENERAL: NAD. WDWN. No pain distress.


SKIN: No rashes, sores, lesions, or open wounds.


HEENT:


            Head: AT/NC


            Nose: Nasal mucosa pink and moist. NTTP maxillary and frontal 

sinus. 


            Throat: Posterior oropharynx without exudates, erythema, or 

tonsillar enlargement.  Uvula midline.


NECK: Supple. Nontender. No lymphadenopathy. 


CHEST: No accessory muscle use. Breathing comfortably and in no distress.


NEURO: Alert. 


PSYCH: Age appropriate behavior.


Triage Information Reviewed: Yes


Vital Signs: 





Vital Signs:











Temp Pulse Resp BP Pulse Ox


 


 97.9 F   73   16   118/79   98 


 


 03/28/20 13:45  03/28/20 13:45  03/28/20 13:45  03/28/20 13:45  03/28/20 13:45











Vital Signs Reviewed: Yes


Dental: Positive: Percussion Tenderness @ - Tooth #31, Gross Decay/Caries @ - 

throughout, Abscess @ - Tooth #31, Cellulitis @ - Tooth #31.  Negative: 

Cervical Lymphadenopathy, Bleeding





Dental Complaint Course/Dx





- Course


Course Of Treatment: 





Tooth #31 abscess





- Differential Dx/Diagnosis


Provider Diagnosis: 


 Dental abscess








Discharge ED





- Sign-Out/Discharge


Documenting (check all that apply): Patient Departure


All imaging exams completed and their final reports reviewed: No Studies





- Discharge Plan


Condition: Stable


Disposition: HOME


Prescriptions: 


Amoxicillin PO (*) [Amoxicillin 875 MG (*)] 875 mg PO BID #14 tab


Chlorhexidine MW 0.12% 473ML* [Peridex Mouth Wash 0.12%] 15 ml MT BID #1 btl


Patient Education Materials:  Toothache (ED)


Referrals: 


Naye Freitas NP [Primary Care Provider] - 


Additional Instructions: 


Please see a dentist as soon as possible





- Billing Disposition and Condition


Condition: STABLE


Disposition: Home

## 2020-04-11 ENCOUNTER — HOSPITAL ENCOUNTER (EMERGENCY)
Dept: HOSPITAL 25 - UCCORT | Age: 35
Discharge: HOME | End: 2020-04-11
Payer: COMMERCIAL

## 2020-04-11 VITALS — SYSTOLIC BLOOD PRESSURE: 105 MMHG | DIASTOLIC BLOOD PRESSURE: 72 MMHG

## 2020-04-11 DIAGNOSIS — J02.0: Primary | ICD-10-CM

## 2020-04-11 DIAGNOSIS — F17.210: ICD-10-CM

## 2020-04-11 DIAGNOSIS — Z20.828: ICD-10-CM

## 2020-04-11 PROCEDURE — 87635 SARS-COV-2 COVID-19 AMP PRB: CPT

## 2020-04-11 PROCEDURE — G2023 SPECIMEN COLLECT COVID-19: HCPCS

## 2020-04-11 PROCEDURE — G0463 HOSPITAL OUTPT CLINIC VISIT: HCPCS

## 2020-04-11 PROCEDURE — 87651 STREP A DNA AMP PROBE: CPT

## 2020-04-11 PROCEDURE — 99212 OFFICE O/P EST SF 10 MIN: CPT

## 2020-04-11 NOTE — UC
FLU HPI





- HPI Summary


HPI Summary: 





34-year-old female with fever, chills, sore throat and body aches over the past 

2 days.  No known exposure to anyone with Covid and she has mostly stayed at 

home.  Everyone in the family is well.  She is a smoker.





- History of Current Complaint


Stated Complaint: FEVER/CHILLS/BODY ACHES/SORE THROAT


Hx Obtained From: Patient


Hx Last Menstrual Period: 1/3/17


Pregnant?: No


Onset/Duration: Gradual Onset, Lasting Days


Severity Currently: Mild


Severity Initially: Mild


Associated Signs & Symptoms: Positive: Fever, Myalgia, Sore Throat


Related Hx: Smoking





- Allergy/Home Medications


Allergies/Adverse Reactions: 


 Allergies











Allergy/AdvReac Type Severity Reaction Status Date / Time


 


No Known Allergies Allergy   Verified 04/11/20 19:29











Home Medications: 


 Home Medications





Amoxicillin PO (*) [Amoxicillin 875 MG (*)] 875 mg PO BID 10 Days #19 tab 04/11/ 20 [Rx]











PMH/Surg Hx/FS Hx/Imm Hx


Previously Healthy: Yes


Psychological History: Depression





- Surgical History


Surgical History: Yes


Surgery Procedure, Year, and Place: fx left ankle/pin in it as a child





- Family History


Known Family History: Positive: None, Other - positive FM for Tinea





- Social History


Alcohol Use: None


Substance Use Type: None


Smoking Status (MU): Light Every Day Tobacco Smoker


Type: Cigarettes


Amount Used/How Often: 5 cigs daily


Household Exposure Type: Cigarettes





- Immunization History


Most Recent Influenza Vaccination: none





Review of Systems


All Other Systems Reviewed And Are Negative: Yes


Constitutional: Positive: Fever, Chills


ENT: Positive: Sore Throat


Musculoskeletal: Positive: Myalgia


Is Patient Immunocompromised?: No





Physical Exam


Triage Information Reviewed: Yes


Appearance: Well-Appearing, No Pain Distress, Well-Nourished


Vital Signs Reviewed: Yes


Eyes: Positive: Conjunctiva Clear


ENT: Positive: Pharynx normal, TMs normal, Uvula midline


Neck: Positive: Supple, Nontender, No Lymphadenopathy


Respiratory: Positive: Lungs clear, Normal breath sounds, No respiratory 

distress, No accessory muscle use


Cardiovascular: Positive: RRR, No Murmur, Pulses Normal, Brisk Capillary Refill


Musculoskeletal Exam: Normal


Neurological Exam: Normal


Psychological Exam: Normal


Skin Exam: Normal





Flu Course/Dx





- Course


Course Of Treatment: 





Rapid strep test: positive


Rapid flu test: Negative


Covid 19 testing: Pending





The patient is comfortable here.  She was given 1 dose of amoxicillin 500 mg by 

mouth here.  She'll follow up with filling a prescription tomorrow.  We did 

discuss staying at home until she gets to Covid testing results.  She is out of 

work right now anyways and states that would not be a problem.  She is to follow

-up with her primary care provider in 3 or 4 days if no improvement.  She is to 

change her toothbrush in 24 hours.





- Differential Dx/Diagnosis


Provider Diagnosis: 


 Strep pharyngitis








Discharge ED





- Sign-Out/Discharge


Documenting (check all that apply): Patient Departure


All imaging exams completed and their final reports reviewed: No Studies





- Discharge Plan


Condition: Good


Disposition: HOME


Prescriptions: 


Amoxicillin PO (*) [Amoxicillin 875 MG (*)] 875 mg PO BID 10 Days #19 tab


Patient Education Materials:  Strep Throat (DC)


Forms:  COVID-19 Tested & Isolation


Referrals: 


Naye Freitas NP [Primary Care Provider] - 


Additional Instructions: 


Increase fluids, change her toothbrush in 24 hours, may take Tylenol every 4 

hours and alternate with ibuprofen every 8 hours for fever or throat pain.  

Definite follow-up with your primary care provider if no improvement in 3 or 4 

days.  Remain at home until you receive the results of your Covid testing.





- Billing Disposition and Condition


Condition: GOOD


Disposition: Home